# Patient Record
Sex: FEMALE | Race: ASIAN | NOT HISPANIC OR LATINO | URBAN - METROPOLITAN AREA
[De-identification: names, ages, dates, MRNs, and addresses within clinical notes are randomized per-mention and may not be internally consistent; named-entity substitution may affect disease eponyms.]

---

## 2022-07-28 ENCOUNTER — EMERGENCY (EMERGENCY)
Facility: HOSPITAL | Age: 22
LOS: 1 days | Discharge: HOME | End: 2022-07-28
Attending: EMERGENCY MEDICINE
Payer: COMMERCIAL

## 2022-07-28 VITALS
DIASTOLIC BLOOD PRESSURE: 83 MMHG | RESPIRATION RATE: 18 BRPM | HEART RATE: 89 BPM | WEIGHT: 136.03 LBS | SYSTOLIC BLOOD PRESSURE: 126 MMHG | HEIGHT: 64 IN | OXYGEN SATURATION: 99 % | TEMPERATURE: 98 F

## 2022-07-28 PROCEDURE — 99283 EMERGENCY DEPT VISIT LOW MDM: CPT

## 2022-07-28 PROCEDURE — 73140 X-RAY EXAM OF FINGER(S): CPT | Mod: 26,RT

## 2022-07-28 PROCEDURE — 73140 X-RAY EXAM OF FINGER(S): CPT

## 2022-07-28 PROCEDURE — 99283 EMERGENCY DEPT VISIT LOW MDM: CPT | Mod: 25

## 2022-07-28 NOTE — ED PROVIDER NOTE - OBJECTIVE STATEMENT
21 year old otherwise healthy female p/w R 5th digit injury. States she was moving furniture and accidentally jammed her pink finger between couch and wall. Able to bend the finger, no numbness, no bleeding. Did not take anything for pain.

## 2022-07-28 NOTE — ED PROVIDER NOTE - PHYSICAL EXAMINATION
Gen - No acute distress, appears comfortable  HEENT - NCAT, EOMI  Resp - no apparent respiratory distress   CV -  RRR  Abd - soft, nondistended  MSK - no gross deformities; +tenderness to R 5th pip, no laceration, NVI distally, flexion intact at PIP/DIP  Extrem - no LE edema/erythema/tenderness  Neuro - no focal motor or sensation deficits  Skin - warm, well perfused

## 2022-07-28 NOTE — ED PROVIDER NOTE - ATTENDING CONTRIBUTION TO CARE
closed on pinky finger r hand prox  from w good strength to distal and prox ip and mcp  ttp to prox phalynx  xr / Symptomatic treatment.

## 2022-07-28 NOTE — ED PROVIDER NOTE - NSFOLLOWUPINSTRUCTIONS_ED_ALL_ED_FT
IMPORTANT INSTRUCTIONS FROM Dr. CONTRERAS:    Please follow up with your personal medical doctor in 24-48 hours.   Bring results from today to your visit.    Take 400mg of motrin or advil or ibuprofen (usually 2 tablets) every 4 hours for pain.  You can also take Tylenol additionally if this is not enough. Ice the area.    If you were advised to take any medications - be sure to review the package insert.    If your symptoms change, get worse or if you have any new symptoms, come to the ER right away.  If you have any questions, call the ER at the phone number on this page.

## 2022-07-28 NOTE — ED PROVIDER NOTE - CARE PROVIDER_API CALL
Maxine Ga (MD)  Plastic Surgery  [address] [place] [address], [address]  [place], [place] [id number]  Phone: [phone]  Fax: [phone]  Follow Up Time: 1-3 Days

## 2022-07-28 NOTE — ED PROVIDER NOTE - PATIENT PORTAL LINK FT
You can access the FollowMyHealth Patient Portal offered by [facility] by registering at the following website: [email or web address]. By joining [facility]’s FollowMyHealth portal, you will also be able to view your health information using other applications (apps) compatible with our system.

## 2022-07-28 NOTE — ED ADULT NURSE NOTE - OBJECTIVE STATEMENT
20 yo presents to the ED from home. A&Ox4, ambulatory c/o R pinky swelling and pain s/p hitting a wall in between a sofa last night. swelling and bruising noted upon assessment. denies taking anything for pain control PTA. pt applied ice with minimal relief. PMS intact, pt reports pain when bending finger. MD at bedside for eval. plan of care discussed. safety and comfort measures maintained. ice pack provided to pt for comfort. denies need for pain medication at this time.

## 2022-07-28 NOTE — ED PROVIDER NOTE - CARE PLAN
1 Principal Discharge DX:	Contusion of finger, right  Assessment and plan of treatment:	pinky proximal phalynx